# Patient Record
(demographics unavailable — no encounter records)

---

## 2024-11-14 NOTE — HISTORY OF PRESENT ILLNESS
[FreeTextEntry1] : 77 year old woman with a history rayneuds presents for an followup cardiac evaluation.   Since her last visit, she has been feeling better. She denies any chest pain.  She has been using the treadmill 3 times a week without any issues.  her stress has improved and her palpitations have resolved. She   denies any  chest pain, PND, orthopnea, lower extremity edema, near syncope, syncope, stroke like symptoms. Medication reconciliation performed. She is compliant with her medications.

## 2024-11-14 NOTE — CARDIOLOGY SUMMARY
[de-identified] : SR [de-identified] : 2/12/2024 medium-sized, mild to moderate defect(s) in the mid to distal anterior wall that is fixed, totally normalizes with prone imaging suggestive of breast attenuation artifact. [de-identified] : 2/2024 normal lv funciton.  [de-identified] : 1/2024 The calculated Agatston score is 214.

## 2024-11-14 NOTE — DISCUSSION/SUMMARY
[FreeTextEntry1] : 77 year woman with a history as listed presents for an initial cardiac evaluation.  Kelsey is doing well. She denies any anginal symptoms. Clinically she is euvolemic on exam. Her EKG did not reveal any significant ischemic changes. Her blood pressure and heart rate are controlled.  She has nonobstructive CAD. She will continue with statin therapy to achieve maintain goal LDL<100 or ideally <70.   Exercise and diet counseling was performed in order to reduce her future cardiovascular risk. She will followup with me in 6 months or sooner if necessary.  [EKG obtained to assist in diagnosis and management of assessed problem(s)] : EKG obtained to assist in diagnosis and management of assessed problem(s)

## 2025-01-22 NOTE — HEALTH RISK ASSESSMENT
[No] : In the past 12 months have you used drugs other than those required for medical reasons? No [Little interest or pleasure doing things] : 1) Little interest or pleasure doing things [Feeling down, depressed, or hopeless] : 2) Feeling down, depressed, or hopeless [0] : 2) Feeling down, depressed, or hopeless: Not at all (0) [PHQ-2 Negative - No further assessment needed] : PHQ-2 Negative - No further assessment needed [[id number]] : 0 [Never] : Never

## 2025-01-22 NOTE — HISTORY OF PRESENT ILLNESS
[de-identified] : Pt went to urgent care and tested positive for the flu on 1/17/25. She was given tamiflu and benzonatate. Feeling much better.

## 2025-02-12 NOTE — ASSESSMENT
[FreeTextEntry1] : pneumonia much improved requests refill of benzonatate  hyperlipidemia continue rosuvastatin

## 2025-02-12 NOTE — HISTORY OF PRESENT ILLNESS
[FreeTextEntry1] : pneumonia [de-identified] : c/o dx of pneumonia by [facility] and went to [facility] for cxr. Feeling much better with augmentin and z-danilo. Continues to be very tired. Coughing has improved. Has been on meds since 2/6/25.

## 2025-02-12 NOTE — HEALTH RISK ASSESSMENT
[No] : In the past 12 months have you used drugs other than those required for medical reasons? No [Little interest or pleasure doing things] : 1) Little interest or pleasure doing things [Feeling down, depressed, or hopeless] : 2) Feeling down, depressed, or hopeless [0] : 2) Feeling down, depressed, or hopeless: Not at all (0) [PHQ-2 Negative - No further assessment needed] : PHQ-2 Negative - No further assessment needed [Never] : Never [[id number]] : 0

## 2025-02-12 NOTE — PHYSICAL EXAM
[Normal] : affect was normal and insight and judgment were intact [de-identified] : tenderness left lateral ribs

## 2025-06-27 NOTE — HISTORY OF PRESENT ILLNESS
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 Because of this , we need the blood pressure to be 120/80 or less to reduce the chance of growth. I recommend repeating the ultrasound in one year. If no change, then 3 years. If any increase, then a vascular surgical consultation.   Keep follow up appointment in one month to make sure blood pressure is at goal. [de-identified] : She donates blood and last time was 6/8/25. She needs a new letter to be able to donate. Usually gives twice a year.

## 2025-06-27 NOTE — HEALTH RISK ASSESSMENT
[Very Good] : ~his/her~  mood as very good [1 or 2 (0 pts)] : 1 or 2 (0 points) [Never (0 pts)] : Never (0 points) [No falls in past year] : Patient reported no falls in the past year [Little interest or pleasure doing things] : 1) Little interest or pleasure doing things [Feeling down, depressed, or hopeless] : 2) Feeling down, depressed, or hopeless [0] : 2) Feeling down, depressed, or hopeless: Not at all (0) [PHQ-2 Negative - No further assessment needed] : PHQ-2 Negative - No further assessment needed [No] : does not take [None] : Patient does not have any barriers to medication adherence [Yes] : Reviewed medication list for presence of high-risk medications. [Opioids] : opioids [Never] : Never [] :  [Fully functional (bathing, dressing, toileting, transferring, walking, feeding)] : Fully functional (bathing, dressing, toileting, transferring, walking, feeding) [Fully functional (using the telephone, shopping, preparing meals, housekeeping, doing laundry, using] : Fully functional and needs no help or supervision to perform IADLs (using the telephone, shopping, preparing meals, housekeeping, doing laundry, using transportation, managing medications and managing finances) [Audit-CScore] : 0 [[id number]] : 0 [Change in mental status noted] : No change in mental status noted [Language] : denies difficulty with language [Behavior] : denies difficulty with behavior [Learning/Retaining New Information] : denies difficulty learning/retaining new information [Handling Complex Tasks] : denies difficulty handling complex tasks [Reasoning] : denies difficulty with reasoning [Spatial Ability and Orientation] : denies difficulty with spatial ability and orientation

## 2025-06-27 NOTE — HEALTH RISK ASSESSMENT
[Very Good] : ~his/her~  mood as very good [1 or 2 (0 pts)] : 1 or 2 (0 points) [Never (0 pts)] : Never (0 points) [No falls in past year] : Patient reported no falls in the past year [Little interest or pleasure doing things] : 1) Little interest or pleasure doing things [Feeling down, depressed, or hopeless] : 2) Feeling down, depressed, or hopeless [0] : 2) Feeling down, depressed, or hopeless: Not at all (0) [PHQ-2 Negative - No further assessment needed] : PHQ-2 Negative - No further assessment needed [No] : does not take [None] : Patient does not have any barriers to medication adherence [Yes] : Reviewed medication list for presence of high-risk medications. [Opioids] : opioids [Never] : Never [] :  [Fully functional (bathing, dressing, toileting, transferring, walking, feeding)] : Fully functional (bathing, dressing, toileting, transferring, walking, feeding) [Fully functional (using the telephone, shopping, preparing meals, housekeeping, doing laundry, using] : Fully functional and needs no help or supervision to perform IADLs (using the telephone, shopping, preparing meals, housekeeping, doing laundry, using transportation, managing medications and managing finances) [Audit-CScore] : 0 [[id number]] : 0 [Change in mental status noted] : No change in mental status noted [Language] : denies difficulty with language [Behavior] : denies difficulty with behavior [Learning/Retaining New Information] : denies difficulty learning/retaining new information [Handling Complex Tasks] : denies difficulty handling complex tasks [Reasoning] : denies difficulty with reasoning [Spatial Ability and Orientation] : denies difficulty with spatial ability and orientation

## 2025-06-27 NOTE — HISTORY OF PRESENT ILLNESS
[de-identified] : She donates blood and last time was 6/8/25. She needs a new letter to be able to donate. Usually gives twice a year.

## 2025-06-27 NOTE — ASSESSMENT
[Vaccines Reviewed] : Immunizations reviewed today. Please see immunization details in the vaccine log within the immunization flowsheet.  [FreeTextEntry1] : pulmonary nodules reviewed her ct scan from 2/2024 and recommended rechecking ct in 3 months order in to recheck  note to continue donating blood advised pt not to donate if less than 110 lbs do not donate more than twice a year also advised her to check with cardiologist before donating again because she has moderate calcium score